# Patient Record
Sex: MALE | Race: WHITE | NOT HISPANIC OR LATINO | Employment: FULL TIME | URBAN - METROPOLITAN AREA
[De-identification: names, ages, dates, MRNs, and addresses within clinical notes are randomized per-mention and may not be internally consistent; named-entity substitution may affect disease eponyms.]

---

## 2022-12-28 ENCOUNTER — HOSPITAL ENCOUNTER (EMERGENCY)
Facility: HOSPITAL | Age: 42
Discharge: HOME | End: 2022-12-29
Attending: EMERGENCY MEDICINE
Payer: COMMERCIAL

## 2022-12-28 DIAGNOSIS — N20.0 RENAL STONE: Primary | ICD-10-CM

## 2022-12-28 LAB
ALBUMIN SERPL BCP-MCNC: 4.4 G/DL (ref 3.5–5.2)
ALP SERPL-CCNC: 54 U/L (ref 55–135)
ALT SERPL W/O P-5'-P-CCNC: 35 U/L (ref 10–44)
ANION GAP SERPL CALC-SCNC: 10 MMOL/L (ref 8–16)
AST SERPL-CCNC: 26 U/L (ref 10–40)
BACTERIA #/AREA URNS HPF: NEGATIVE /HPF
BASOPHILS # BLD AUTO: 0.06 K/UL (ref 0–0.2)
BASOPHILS NFR BLD: 0.5 % (ref 0–1.9)
BILIRUB SERPL-MCNC: 0.8 MG/DL (ref 0.1–1)
BILIRUB UR QL STRIP: ABNORMAL
BUN SERPL-MCNC: 20 MG/DL (ref 6–20)
CALCIUM SERPL-MCNC: 9.2 MG/DL (ref 8.7–10.5)
CHLORIDE SERPL-SCNC: 98 MMOL/L (ref 95–110)
CLARITY UR: CLEAR
CO2 SERPL-SCNC: 26 MMOL/L (ref 23–29)
COLOR UR: ABNORMAL
CREAT SERPL-MCNC: 0.9 MG/DL (ref 0.5–1.4)
CREAT SERPL-MCNC: 0.9 MG/DL (ref 0.5–1.4)
DIFFERENTIAL METHOD: ABNORMAL
EOSINOPHIL # BLD AUTO: 0.3 K/UL (ref 0–0.5)
EOSINOPHIL NFR BLD: 2.7 % (ref 0–8)
ERYTHROCYTE [DISTWIDTH] IN BLOOD BY AUTOMATED COUNT: 13.2 % (ref 11.5–14.5)
EST. GFR  (NO RACE VARIABLE): >60 ML/MIN/1.73 M^2
GLUCOSE SERPL-MCNC: 182 MG/DL (ref 70–110)
GLUCOSE UR QL STRIP: NEGATIVE
HCT VFR BLD AUTO: 39.5 % (ref 40–54)
HGB BLD-MCNC: 13.3 G/DL (ref 14–18)
HGB UR QL STRIP: ABNORMAL
HYALINE CASTS #/AREA URNS LPF: 3 /LPF
IMM GRANULOCYTES # BLD AUTO: 0.05 K/UL (ref 0–0.04)
IMM GRANULOCYTES NFR BLD AUTO: 0.4 % (ref 0–0.5)
KETONES UR QL STRIP: NEGATIVE
LEUKOCYTE ESTERASE UR QL STRIP: NEGATIVE
LYMPHOCYTES # BLD AUTO: 1.7 K/UL (ref 1–4.8)
LYMPHOCYTES NFR BLD: 14.1 % (ref 18–48)
MCH RBC QN AUTO: 28.9 PG (ref 27–31)
MCHC RBC AUTO-ENTMCNC: 33.7 G/DL (ref 32–36)
MCV RBC AUTO: 86 FL (ref 82–98)
MICROSCOPIC COMMENT: ABNORMAL
MONOCYTES # BLD AUTO: 0.8 K/UL (ref 0.3–1)
MONOCYTES NFR BLD: 6.3 % (ref 4–15)
NEUTROPHILS # BLD AUTO: 9.2 K/UL (ref 1.8–7.7)
NEUTROPHILS NFR BLD: 76 % (ref 38–73)
NITRITE UR QL STRIP: POSITIVE
NRBC BLD-RTO: 0 /100 WBC
PH UR STRIP: 6 [PH] (ref 5–8)
PLATELET # BLD AUTO: 394 K/UL (ref 150–450)
PMV BLD AUTO: 10.7 FL (ref 9.2–12.9)
POTASSIUM SERPL-SCNC: 3.3 MMOL/L (ref 3.5–5.1)
PROT SERPL-MCNC: 7.7 G/DL (ref 6–8.4)
PROT UR QL STRIP: ABNORMAL
RBC # BLD AUTO: 4.61 M/UL (ref 4.6–6.2)
RBC #/AREA URNS HPF: 38 /HPF (ref 0–4)
SAMPLE: NORMAL
SODIUM SERPL-SCNC: 134 MMOL/L (ref 136–145)
SP GR UR STRIP: 1.02 (ref 1–1.03)
SQUAMOUS #/AREA URNS HPF: 2 /HPF
URN SPEC COLLECT METH UR: ABNORMAL
UROBILINOGEN UR STRIP-ACNC: ABNORMAL EU/DL
WBC # BLD AUTO: 12.12 K/UL (ref 3.9–12.7)
WBC #/AREA URNS HPF: 4 /HPF (ref 0–5)

## 2022-12-28 PROCEDURE — 63600175 PHARM REV CODE 636 W HCPCS: Performed by: EMERGENCY MEDICINE

## 2022-12-28 PROCEDURE — 96365 THER/PROPH/DIAG IV INF INIT: CPT | Mod: 59

## 2022-12-28 PROCEDURE — 80053 COMPREHEN METABOLIC PANEL: CPT | Performed by: EMERGENCY MEDICINE

## 2022-12-28 PROCEDURE — 96375 TX/PRO/DX INJ NEW DRUG ADDON: CPT

## 2022-12-28 PROCEDURE — 85025 COMPLETE CBC W/AUTO DIFF WBC: CPT | Performed by: EMERGENCY MEDICINE

## 2022-12-28 PROCEDURE — 81001 URINALYSIS AUTO W/SCOPE: CPT | Performed by: EMERGENCY MEDICINE

## 2022-12-28 PROCEDURE — 99285 EMERGENCY DEPT VISIT HI MDM: CPT | Mod: 25

## 2022-12-28 PROCEDURE — 25500020 PHARM REV CODE 255: Performed by: EMERGENCY MEDICINE

## 2022-12-28 RX ORDER — KETOROLAC TROMETHAMINE 30 MG/ML
15 INJECTION, SOLUTION INTRAMUSCULAR; INTRAVENOUS
Status: COMPLETED | OUTPATIENT
Start: 2022-12-28 | End: 2022-12-28

## 2022-12-28 RX ADMIN — KETOROLAC TROMETHAMINE 15 MG: 30 INJECTION, SOLUTION INTRAMUSCULAR; INTRAVENOUS at 10:12

## 2022-12-28 RX ADMIN — IOHEXOL 100 ML: 350 INJECTION, SOLUTION INTRAVENOUS at 11:12

## 2022-12-29 VITALS
DIASTOLIC BLOOD PRESSURE: 85 MMHG | HEART RATE: 86 BPM | BODY MASS INDEX: 29.03 KG/M2 | HEIGHT: 67 IN | WEIGHT: 185 LBS | OXYGEN SATURATION: 100 % | TEMPERATURE: 98 F | SYSTOLIC BLOOD PRESSURE: 125 MMHG | RESPIRATION RATE: 14 BRPM

## 2022-12-29 PROCEDURE — 63600175 PHARM REV CODE 636 W HCPCS: Performed by: EMERGENCY MEDICINE

## 2022-12-29 RX ORDER — CEPHALEXIN 500 MG/1
500 CAPSULE ORAL 4 TIMES DAILY
Qty: 20 CAPSULE | Refills: 0 | Status: SHIPPED | OUTPATIENT
Start: 2022-12-29 | End: 2023-01-03

## 2022-12-29 RX ORDER — IBUPROFEN 600 MG/1
600 TABLET ORAL EVERY 6 HOURS PRN
Qty: 20 TABLET | Refills: 0 | Status: SHIPPED | OUTPATIENT
Start: 2022-12-29 | End: 2023-01-10

## 2022-12-29 RX ORDER — HYDROCODONE BITARTRATE AND ACETAMINOPHEN 5; 325 MG/1; MG/1
1 TABLET ORAL EVERY 6 HOURS PRN
Qty: 8 TABLET | Refills: 0 | Status: SHIPPED | OUTPATIENT
Start: 2022-12-29 | End: 2023-01-10

## 2022-12-29 RX ORDER — TAMSULOSIN HYDROCHLORIDE 0.4 MG/1
0.4 CAPSULE ORAL DAILY
Qty: 10 CAPSULE | Refills: 0 | Status: SHIPPED | OUTPATIENT
Start: 2022-12-29 | End: 2023-01-10

## 2022-12-29 RX ADMIN — CEFTRIAXONE 1 G: 1 INJECTION, SOLUTION INTRAVENOUS at 12:12

## 2022-12-29 NOTE — ED PROVIDER NOTES
Encounter Date: 12/28/2022       History     Chief Complaint   Patient presents with    Abdominal Pain     He is hav    Urinary Retention     He is having trouble going     HPI    Seen and evaluated.  Presented with a chief complaint of possibly urinary retention.  He notes suprapubic discomfort.  He states he is having trouble urinating.  He took azo prior to coming to the hospital secondary to concerns of possible urinary tract infection.  He does the pain is in the suprapubic region.  He denies any history of kidney stones or other urinary tract problems.  He reports a severe 9 to 10/10 focal suprapubic discomfort in the midline.  Reports no alleviating factors.  This is an acute episodic process that began earlier tonight and has been persistent    Review of patient's allergies indicates:   Allergen Reactions    Sulfa (sulfonamide antibiotics)      No past medical history on file.  No past surgical history on file.  No family history on file.     Review of Systems   Constitutional:  Negative for fever.   HENT:  Negative for sore throat.    Respiratory:  Negative for shortness of breath.    Cardiovascular:  Negative for chest pain.   Gastrointestinal:  Negative for nausea.   Genitourinary:  Positive for difficulty urinating and dysuria. Negative for penile discharge and testicular pain.   Musculoskeletal:  Negative for back pain.   Skin:  Negative for rash.   Neurological:  Negative for weakness.   Psychiatric/Behavioral:  Negative for confusion.    All other systems reviewed and are negative.    Physical Exam     Initial Vitals [12/28/22 2121]   BP Pulse Resp Temp SpO2   (!) 153/105 75 18 97.5 °F (36.4 °C) 97 %      MAP       --         Physical Exam    Nursing note and vitals reviewed.  Constitutional: He appears well-developed and well-nourished.   HENT:   Head: Normocephalic and atraumatic.   Eyes: Conjunctivae are normal.   Cardiovascular:  Normal rate and regular rhythm.           Abdominal: Abdomen is  soft.   Genitourinary:    Genitourinary Comments: Normal  exam, no tenderness to palpation of the bilateral testicles, no improvement in symptoms with elevation     Musculoskeletal:         General: Normal range of motion.     Neurological: He is alert and oriented to person, place, and time.   Skin: Skin is warm and dry.   Psychiatric: He has a normal mood and affect. His speech is normal.       ED Course   Procedures  Labs Reviewed   CBC W/ AUTO DIFFERENTIAL - Abnormal; Notable for the following components:       Result Value    Hemoglobin 13.3 (*)     Hematocrit 39.5 (*)     Gran # (ANC) 9.2 (*)     Immature Grans (Abs) 0.05 (*)     Gran % 76.0 (*)     Lymph % 14.1 (*)     All other components within normal limits   COMPREHENSIVE METABOLIC PANEL - Abnormal; Notable for the following components:    Sodium 134 (*)     Potassium 3.3 (*)     Glucose 182 (*)     Alkaline Phosphatase 54 (*)     All other components within normal limits   URINALYSIS - Abnormal; Notable for the following components:    Color, UA Orange (*)     Protein, UA Trace (*)     Bilirubin (UA) 1+ (*)     Occult Blood UA 2+ (*)     Nitrite, UA Positive (*)     Urobilinogen, UA 2.0-3.0 (*)     All other components within normal limits    Narrative:     Collection Type->Urine, Clean Catch   URINALYSIS MICROSCOPIC - Abnormal; Notable for the following components:    RBC, UA 38 (*)     Hyaline Casts, UA 3 (*)     All other components within normal limits    Narrative:     Collection Type->Urine, Clean Catch   ISTAT CREATININE          Imaging Results              CT Abdomen Pelvis With Contrast (Final result)  Result time 12/28/22 23:49:27      Final result by Ivan Gore MD (12/28/22 23:49:27)                   Narrative:    CT ABDOMEN PELVIS WITH IV CONTRAST    Exam date: December 28, 2022    Comparison: None    Indication: Abdominal Pain (He is hav), urinary Retention (He is having trouble going)    Technique:    Multiple helical axial images  were obtained through the abdomen and pelvis using intravenous contrast. Coronal and sagittal reformatted images were obtained.    All CT scans at this facility use dose modulation, iterative reconstruction, and/or weight-based dosing when appropriate to reduce radiation dose to as low as reasonably achievable.    Findings:    Lung bases: [Appear unremarkable].    Liver: [There is low-attenuation suggesting fatty changes.]    Gallbladder/biliary: [Appears unremarkable]    Pancreas: [Unremarkable.  No evidence of ductal enlargement.]    Spleen: Appears unremarkable. No splenomegaly.    Adrenals: Unremarkable.    Kidneys and ureters: There is 2 mm stone at the right ureterovesicular junction with mild right hydronephrosis and hydroureter and right perinephric and perirenal stranding. [There are a few small stones in both kidneys measuring up to 3 mm.  Normal enhancement.] There are a few cysts in both kidneys measuring up to 2.2 cm.    Bladder: Unremarkable.    Pelvic organs: Unremarkable.    Bowel: [No evidence of bowel obstruction.  No bowel wall thickening.] Appendix appears unremarkable.    Vasculature: Minimal aortic atherosclerosis noted.    Peritoneum: No free air. No significant free fluid.    Lymph nodes: Unremarkable.    Soft tissues: There is a tiny fat-containing umbilical hernia.    Bones: Unremarkable.    Impression:    1. Right-sided 2 mm stone at the ureterovesicular junction with mild right hydronephrosis and hydroureter and right perinephric and periureteral inflammatory changes.  2. Bilateral nephrolithiasis.  3. Hepatic steatosis.    Electronically signed by:  Ivan Gore MD  12/28/2022 11:49 PM CST Workstation: [id number]                                     Medications   ketorolac injection 15 mg (15 mg Intravenous Given 12/28/22 2222)   iohexoL (OMNIPAQUE 350) injection 100 mL (100 mLs Intravenous Given 12/28/22 2302)   cefTRIAXone (ROCEPHIN) 1 g/50 mL D5W IVPB (0 g Intravenous Stopped 12/29/22  0046)     Medical Decision Making:   Initial Assessment:   Seen and examined.  Presented initially with complaint of inability to urinate.  Ultrasound performed revealed only 20-30 cc of urine in the bladder.  This was not consistent with urinary retention.  Considered the possibility of testicular pathology with referred pain to the suprapubic region,  exam was normal without evidence of torsion, testicular swelling, or high-riding testicle.  Urinalysis was completed, and showed red cells and nitrites.  CT scan performed raised concern for urinary tract pathology including renal stone.  Treated with antibiotics, discharged on Keflex, had contacted Urology who will provide outpatient follow-up.  Hemodynamically stable and not in extremis the time of discharge.                        Clinical Impression:   Final diagnoses:  [N20.0] Renal stone (Primary)        ED Disposition Condition    Discharge Stable          ED Prescriptions       Medication Sig Dispense Start Date End Date Auth. Provider    cephALEXin (KEFLEX) 500 MG capsule Take 1 capsule (500 mg total) by mouth 4 (four) times daily. for 5 days 20 capsule 12/29/2022 1/3/2023 Unruly Briggs Jr., MD    tamsulosin (FLOMAX) 0.4 mg Cap Take 1 capsule (0.4 mg total) by mouth once daily. 10 capsule 12/29/2022 12/29/2023 Unruly Briggs Jr., MD    HYDROcodone-acetaminophen (NORCO) 5-325 mg per tablet Take 1 tablet by mouth every 6 (six) hours as needed for Pain. 8 tablet 12/29/2022 -- Unruly Briggs Jr., MD    ibuprofen (ADVIL,MOTRIN) 600 MG tablet Take 1 tablet (600 mg total) by mouth every 6 (six) hours as needed for Pain. 20 tablet 12/29/2022 -- Unruly Briggs Jr., MD          Follow-up Information    None          Unruly Briggs Jr., MD  12/29/22 3950

## 2023-01-10 ENCOUNTER — OFFICE VISIT (OUTPATIENT)
Dept: UROLOGY | Facility: CLINIC | Age: 43
End: 2023-01-10
Payer: COMMERCIAL

## 2023-01-10 VITALS
HEART RATE: 71 BPM | SYSTOLIC BLOOD PRESSURE: 121 MMHG | BODY MASS INDEX: 29.02 KG/M2 | HEIGHT: 67 IN | WEIGHT: 184.88 LBS | DIASTOLIC BLOOD PRESSURE: 79 MMHG

## 2023-01-10 DIAGNOSIS — N20.0 NEPHROLITHIASIS: Primary | ICD-10-CM

## 2023-01-10 LAB
BILIRUBIN, UA POC OHS: NEGATIVE
BLOOD, UA POC OHS: NEGATIVE
CLARITY, UA POC OHS: CLEAR
COLOR, UA POC OHS: YELLOW
GLUCOSE, UA POC OHS: NEGATIVE
KETONES, UA POC OHS: NEGATIVE
LEUKOCYTES, UA POC OHS: NEGATIVE
NITRITE, UA POC OHS: NEGATIVE
PH, UA POC OHS: 7
PROTEIN, UA POC OHS: NEGATIVE
SPECIFIC GRAVITY, UA POC OHS: 1.02
UROBILINOGEN, UA POC OHS: 0.2

## 2023-01-10 PROCEDURE — 1159F MED LIST DOCD IN RCRD: CPT | Mod: CPTII,S$GLB,, | Performed by: UROLOGY

## 2023-01-10 PROCEDURE — 1160F PR REVIEW ALL MEDS BY PRESCRIBER/CLIN PHARMACIST DOCUMENTED: ICD-10-PCS | Mod: CPTII,S$GLB,, | Performed by: UROLOGY

## 2023-01-10 PROCEDURE — 99999 PR PBB SHADOW E&M-EST. PATIENT-LVL IV: ICD-10-PCS | Mod: PBBFAC,,, | Performed by: UROLOGY

## 2023-01-10 PROCEDURE — 3074F SYST BP LT 130 MM HG: CPT | Mod: CPTII,S$GLB,, | Performed by: UROLOGY

## 2023-01-10 PROCEDURE — 3008F PR BODY MASS INDEX (BMI) DOCUMENTED: ICD-10-PCS | Mod: CPTII,S$GLB,, | Performed by: UROLOGY

## 2023-01-10 PROCEDURE — 81003 POCT URINALYSIS(INSTRUMENT): ICD-10-PCS | Mod: QW,S$GLB,, | Performed by: UROLOGY

## 2023-01-10 PROCEDURE — 3078F PR MOST RECENT DIASTOLIC BLOOD PRESSURE < 80 MM HG: ICD-10-PCS | Mod: CPTII,S$GLB,, | Performed by: UROLOGY

## 2023-01-10 PROCEDURE — 3074F PR MOST RECENT SYSTOLIC BLOOD PRESSURE < 130 MM HG: ICD-10-PCS | Mod: CPTII,S$GLB,, | Performed by: UROLOGY

## 2023-01-10 PROCEDURE — 3078F DIAST BP <80 MM HG: CPT | Mod: CPTII,S$GLB,, | Performed by: UROLOGY

## 2023-01-10 PROCEDURE — 1160F RVW MEDS BY RX/DR IN RCRD: CPT | Mod: CPTII,S$GLB,, | Performed by: UROLOGY

## 2023-01-10 PROCEDURE — 99999 PR PBB SHADOW E&M-EST. PATIENT-LVL IV: CPT | Mod: PBBFAC,,, | Performed by: UROLOGY

## 2023-01-10 PROCEDURE — 1159F PR MEDICATION LIST DOCUMENTED IN MEDICAL RECORD: ICD-10-PCS | Mod: CPTII,S$GLB,, | Performed by: UROLOGY

## 2023-01-10 PROCEDURE — 3008F BODY MASS INDEX DOCD: CPT | Mod: CPTII,S$GLB,, | Performed by: UROLOGY

## 2023-01-10 PROCEDURE — 99204 OFFICE O/P NEW MOD 45 MIN: CPT | Mod: S$GLB,,, | Performed by: UROLOGY

## 2023-01-10 PROCEDURE — 81003 URINALYSIS AUTO W/O SCOPE: CPT | Mod: QW,S$GLB,, | Performed by: UROLOGY

## 2023-01-10 PROCEDURE — 99204 PR OFFICE/OUTPT VISIT, NEW, LEVL IV, 45-59 MIN: ICD-10-PCS | Mod: S$GLB,,, | Performed by: UROLOGY

## 2023-01-10 RX ORDER — TAMSULOSIN HYDROCHLORIDE 0.4 MG/1
0.4 CAPSULE ORAL NIGHTLY
Qty: 30 CAPSULE | Refills: 3 | Status: SHIPPED | OUTPATIENT
Start: 2023-01-10 | End: 2023-04-18

## 2023-01-10 RX ORDER — ESCITALOPRAM OXALATE 10 MG/1
TABLET ORAL
COMMUNITY

## 2023-01-10 RX ORDER — DEXMETHYLPHENIDATE HYDROCHLORIDE 25 MG/1
CAPSULE, EXTENDED RELEASE ORAL
COMMUNITY

## 2023-01-10 RX ORDER — KETOROLAC TROMETHAMINE 10 MG/1
10 TABLET, FILM COATED ORAL EVERY 8 HOURS PRN
Qty: 10 TABLET | Refills: 0 | Status: SHIPPED | OUTPATIENT
Start: 2023-01-10 | End: 2023-04-18

## 2023-01-10 NOTE — PATIENT INSTRUCTIONS
Likely that he passed the stone.  Was very tiny.  No longer had any pain.  Urinalysis today negative.  Will hold off on checking the ultrasound right now to confirm.  However if he gets pain anytime soon within the next weeks then will obtain an ultrasound.  Otherwise will plan to get ultrasound an x-ray in 3 months.  Also he should do a 24 hour urine in 3 months or before 3 months.  In the meantime drink lots of water.  Avoid salt.  Avoid or decrease protein intake.  Increase citrate intake which is in lemon.     Short plan:   Sent in Toradol, Flomax to take if he feels like he is passing a stone.  However if pain last more than 2-3 days call us for follow-up and imaging to avoid ER.  However if he has fever or chills or severe pain or having nausea vomiting the just go to the ER.  Twenty-four urine collection  Renal bladder ultrasound and x-ray in 3 months.  Will monitor stones and treat if they enlarge.  Goal is to decrease rate of stone growth  Family history of prostate cancer.  Uncle/paternal  age 58.  Can check PSA 45 or 50.  Follow-up in 3 months to review x-ray, ultrasound, 24 urine.    Natural ways to increase citrate:  1/2 cup lemon juice in 2L of water per day    Prescription:  Potassium citrate (size of horse pills) 15meq twice a day - medication is in the coating so may see in stool    Online:   Moonstone citrate: 4 capsules a day (prefer)  [email or web address]            CITRATE FOR KIDNEY STONES: WHY IS CITRATE IMPORTANT?  Alkali citrate helps prevent kidney stones.      Anyone whos suffered from a kidney stone knows that they dont ever want another one. Alkali citrate for kidney stones is key to preventing this painful condition, although there are some pervasive myths about how it works and where to find it.    Not all citrates are the same. While many claim that drinking alkali citrate-rich lemon juice and water will help prevent  stones, the citrate that you can get from citric acid (such as lemon juice, citrus fruit, etc.) actually isnt enough to prevent the formation of kidney stones. In fact, youd have to drink several quarts of lemonade per day to get the right amount of citrus citrate to be preventive. Moreover, citrus fruits are relatively low in pH and dont support healthy kidney chemistry. Alkali citrate, however, is the powerful component that is known to prevent kidney stone formation along with other key nutrients and ingredients.      HOW IT WORKS  The science of alkali citrate & Moonstone.     Alkali citrate helps prevent kidney stone formation by reducing the ability of calcium in the urine to bind with oxalate. Specifically formulated by kidney stone specialists--including nephrologists and urologists--Moonstone contains clinically significant amounts of alkali citrate that increases urine citrate and urine pH in the body. This works because increased urine citrate helps prevent calcium stones, while increased urine pH helps dissolve and prevent uric acid stones and cystine stones. *1 Although citrate from lemon juice and citrus beverages may raise urine citrate, these drinks are missing the other key nutrients that help prevent stone formation. For citrate to enter the urine and act to prevent kidney stones, it must be ingested accompanied by sodium, potassium, or magnesium--and this is exactly what Moonstone provides. Ultimately, lemon juice and other beverages cannot realistically supply what the body needs to prevent kidney stones. Moonstones higher net citrate content provides more net base, also known as alkali, which is essential to preventing kidney stone formation.       CREATED BY KIDNEY SPECIALISTS  Painful stones can affect anyone.    Nephrologist and internationally recognized kidney stone expert Luiz Bergman MD--a  of [facility]--can relate to the unbearable pain of passing kidney stones, because  [facility] had three of his own. Dr. Bergman had his first stone in his late 20s, and he remembers it as if it were yesterday. I was walking down the street, and I was suddenly struck by this tremendous pain. It felt like a large s knife had been stuck in my back, Dr. Bergman says. And I was doubled over, soaked through my clothes sweating. A dramatic day in the ER followed. But once the stone passed, Dr. Bergman says that he--like most people--forgot about it. Very few people take steps to prevent a repeat attack, but most kidney stone sufferers will form another stone within 5-10 years.     Most people who pass relatively small stones in the emergency room--as I did--typically dont follow a healthy diet and dont drink enough water, Dr. Bergman says. 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 He wanted to find a preventive solution without relying on distasteful horse-sized prescription pills, a restrictive diet thats difficult for people to follow, or drinking gallons of water or lemonade per day. With an expert team of doctors and health care professionals coming together, Moonstone was formulated.

## 2023-01-10 NOTE — Clinical Note
· Renal bladder ultrasound and x-ray in 3 months.  Will monitor stones and treat if they enlarge.  Goal is to decrease rate of stone growth Send 24 hour kit to house Follow-up in 3 months to review x-ray, ultrasound, 24 urine.

## 2023-01-10 NOTE — PROGRESS NOTES
[facility] [place]  PCP: Jeffery Jasmine MD  Referred by: Self, Amrit  DOS: 1/10/2023    CC: stone      Subjective:      Initial consult by me in CLINIC for kidney stone  on 1/10/22    Mike Mason is a 42 y.o. male     The patient has been referred for a kidney stone found:   He went to the ER on 2022 with less than 24 hour history of right lower quadrant pain, urinary urgency.  No fevers, no chills.  Took an azo at home because his mom thought he might have a UTI and his mom also had a history of stones.  He had a CT renal stone study showed a 2 mm right UVJ stone.  The urinate and so they placed a Arreola catheter which showed nitrite positive urine but again he had taken an azo.  After they placed a Arreola his pain resolved.  He took Flomax for a few days after.  Never actually caught a stone.  Has not had any pain since.  Urinalysis today negative.    Imagin mm right UVJ stone with upstream hydro.  1 mm right mid pole stone.  Left mid pole 2 mm stone.  Left lower pole 2-3 mm stone.  Bilateral simple renal cyst.      Previous stone episode(s) and workup:No  Family history:Yes - mother with multiple stones  Any other stone risk factors: none (vit D/calcium)  Anticoagulation: No  The patient does not have any other urologic issues.     Today:   Symptoms: no pain.   Ua: negative     Other pertinent labs:  Lab Results   Component Value Date    CREATININE 0.9 2022     No results found for: LABA1C, HGBA1C  Lab Results   Component Value Date    CALCIUM 9.2 2022           Urine history: family history of kidney, bladder or prostate cancer:father had colon cancer a 53, Paternal uncle  a 58 of prostate cancer, personal or family history of kidney stones: mother with multiple stones, pt with stones,tobacco use: No, anticoagulation: No  1/10/23 Neg  22 Nit+/2+bld, 38 rbc/4 wbc/2 sq (on azo)    Current REVIEW OF SYSTEMS: Negative for the remaining 12 points of ROS  except for as stated above    No past medical history on file.    Fam Hx: Reviewed- pertinent family hx as above    Allergies:  Sulfa (sulfonamide antibiotics)    Objective:     Vitals:    01/10/23 1057   BP: 121/79   Pulse: 71         Pertinent  exam in HPI    LABS REVIEW:  Recent labs:   Recent Labs   Lab 22  2200   WBC 12.12   Hemoglobin 13.3 L   Hematocrit 39.5 L   Platelets 394   ]  Recent Labs   Lab 22  2200   Sodium 134 L   Potassium 3.3 L   Chloride 98   CO2 26   BUN 20   Creatinine 0.9   Glucose 182 H   Calcium 9.2   Alkaline Phosphatase 54 L   Total Protein 7.7   Albumin 4.4   Total Bilirubin 0.8   AST 26   ALT 35         Assessment:     Mike Mason is a 42 y.o. male with No diagnosis found.      Plan:     Likely that he passed the stone.  Was very tiny.  No longer had any pain.  Urinalysis today negative.  Will hold off on checking the ultrasound right now to confirm.  However if he gets pain anytime soon within the next weeks then will obtain an ultrasound.  Otherwise will plan to get ultrasound an x-ray in 3 months.  Also he should do a 24 hour urine in 3 months or before 3 months.  In the meantime drink lots of water.  Avoid salt.  Avoid or decrease protein intake.  Increase citrate intake which is in lemon.     Short plan:   Sent in Toradol, Flomax to take if he feels like he is passing a stone.  However if pain last more than 2-3 days call us for follow-up and imaging to avoid ER.  However if he has fever or chills or severe pain or having nausea vomiting the just go to the ER.  Twenty-four urine collection  Renal bladder ultrasound and x-ray in 3 months.  Will monitor stones and treat if they enlarge.  Goal is to decrease rate of stone growth  Family history of prostate cancer.  Uncle/paternal  age 58.  Can check PSA 45 or 50.  Follow-up in 3 months to review x-ray, ultrasound, 24 urine.      See below for details:   The spontaneous passage rate in 20 weeks was 312 out of 392  stones, 98% in 0-2 mm, 98% in 3 mm, 81% in 4 mm, 65% in 5 mm, 33% in 6 mm and 9% in ?6.5 mm wide stones.  The stone size and location predicted spontaneous ureteric stone passage. The side and the grade of hydronephrosis only predicted stone passage in specific subgroups                    Sury Esteban MD

## 2023-04-11 ENCOUNTER — HOSPITAL ENCOUNTER (OUTPATIENT)
Dept: RADIOLOGY | Facility: HOSPITAL | Age: 43
Discharge: HOME | End: 2023-04-11
Attending: UROLOGY
Payer: COMMERCIAL

## 2023-04-11 DIAGNOSIS — N20.0 NEPHROLITHIASIS: ICD-10-CM

## 2023-04-11 PROCEDURE — 74018 RADEX ABDOMEN 1 VIEW: CPT | Mod: TC,FY

## 2023-04-11 PROCEDURE — 74018 RADEX ABDOMEN 1 VIEW: CPT | Mod: 26,,, | Performed by: RADIOLOGY

## 2023-04-11 PROCEDURE — 74018 XR ABDOMEN AP 1 VIEW: ICD-10-PCS | Mod: 26,,, | Performed by: RADIOLOGY

## 2023-04-11 PROCEDURE — 76770 US RETROPERITONEAL COMPLETE: ICD-10-PCS | Mod: 26,,, | Performed by: RADIOLOGY

## 2023-04-11 PROCEDURE — 76770 US EXAM ABDO BACK WALL COMP: CPT | Mod: TC

## 2023-04-11 PROCEDURE — 76770 US EXAM ABDO BACK WALL COMP: CPT | Mod: 26,,, | Performed by: RADIOLOGY

## 2023-04-17 ENCOUNTER — TELEPHONE (OUTPATIENT)
Dept: UROLOGY | Facility: CLINIC | Age: 43
End: 2023-04-17
Payer: COMMERCIAL

## 2023-04-17 NOTE — TELEPHONE ENCOUNTER
Phoned patient has questions about containers in 24 hour urine kit. Patient provider phone to dary. Verbally voiced understanding.

## 2023-04-18 ENCOUNTER — OFFICE VISIT (OUTPATIENT)
Dept: UROLOGY | Facility: CLINIC | Age: 43
End: 2023-04-18
Payer: COMMERCIAL

## 2023-04-18 VITALS
SYSTOLIC BLOOD PRESSURE: 114 MMHG | HEIGHT: 67 IN | DIASTOLIC BLOOD PRESSURE: 78 MMHG | HEART RATE: 74 BPM | WEIGHT: 184.06 LBS | BODY MASS INDEX: 28.89 KG/M2

## 2023-04-18 DIAGNOSIS — N20.0 NEPHROLITHIASIS: Primary | ICD-10-CM

## 2023-04-18 LAB
BILIRUBIN, UA POC OHS: NEGATIVE
BLOOD, UA POC OHS: NEGATIVE
CLARITY, UA POC OHS: CLEAR
COLOR, UA POC OHS: YELLOW
GLUCOSE, UA POC OHS: NEGATIVE
KETONES, UA POC OHS: NEGATIVE
LEUKOCYTES, UA POC OHS: NEGATIVE
NITRITE, UA POC OHS: NEGATIVE
PH, UA POC OHS: 5.5
PROTEIN, UA POC OHS: NEGATIVE
SPECIFIC GRAVITY, UA POC OHS: 1.02
UROBILINOGEN, UA POC OHS: 0.2

## 2023-04-18 PROCEDURE — 3078F PR MOST RECENT DIASTOLIC BLOOD PRESSURE < 80 MM HG: ICD-10-PCS | Mod: CPTII,S$GLB,, | Performed by: UROLOGY

## 2023-04-18 PROCEDURE — 3074F SYST BP LT 130 MM HG: CPT | Mod: CPTII,S$GLB,, | Performed by: UROLOGY

## 2023-04-18 PROCEDURE — 1160F RVW MEDS BY RX/DR IN RCRD: CPT | Mod: CPTII,S$GLB,, | Performed by: UROLOGY

## 2023-04-18 PROCEDURE — 99214 OFFICE O/P EST MOD 30 MIN: CPT | Mod: S$GLB,,, | Performed by: UROLOGY

## 2023-04-18 PROCEDURE — 3078F DIAST BP <80 MM HG: CPT | Mod: CPTII,S$GLB,, | Performed by: UROLOGY

## 2023-04-18 PROCEDURE — 3008F PR BODY MASS INDEX (BMI) DOCUMENTED: ICD-10-PCS | Mod: CPTII,S$GLB,, | Performed by: UROLOGY

## 2023-04-18 PROCEDURE — 1160F PR REVIEW ALL MEDS BY PRESCRIBER/CLIN PHARMACIST DOCUMENTED: ICD-10-PCS | Mod: CPTII,S$GLB,, | Performed by: UROLOGY

## 2023-04-18 PROCEDURE — 81003 URINALYSIS AUTO W/O SCOPE: CPT | Mod: QW,S$GLB,, | Performed by: UROLOGY

## 2023-04-18 PROCEDURE — 1159F PR MEDICATION LIST DOCUMENTED IN MEDICAL RECORD: ICD-10-PCS | Mod: CPTII,S$GLB,, | Performed by: UROLOGY

## 2023-04-18 PROCEDURE — 3008F BODY MASS INDEX DOCD: CPT | Mod: CPTII,S$GLB,, | Performed by: UROLOGY

## 2023-04-18 PROCEDURE — 99999 PR PBB SHADOW E&M-EST. PATIENT-LVL III: ICD-10-PCS | Mod: PBBFAC,,, | Performed by: UROLOGY

## 2023-04-18 PROCEDURE — 81003 POCT URINALYSIS(INSTRUMENT): ICD-10-PCS | Mod: QW,S$GLB,, | Performed by: UROLOGY

## 2023-04-18 PROCEDURE — 1159F MED LIST DOCD IN RCRD: CPT | Mod: CPTII,S$GLB,, | Performed by: UROLOGY

## 2023-04-18 PROCEDURE — 99214 PR OFFICE/OUTPT VISIT, EST, LEVL IV, 30-39 MIN: ICD-10-PCS | Mod: S$GLB,,, | Performed by: UROLOGY

## 2023-04-18 PROCEDURE — 3074F PR MOST RECENT SYSTOLIC BLOOD PRESSURE < 130 MM HG: ICD-10-PCS | Mod: CPTII,S$GLB,, | Performed by: UROLOGY

## 2023-04-18 PROCEDURE — 99999 PR PBB SHADOW E&M-EST. PATIENT-LVL III: CPT | Mod: PBBFAC,,, | Performed by: UROLOGY

## 2023-04-18 NOTE — Clinical Note
· Follow-up in 4 to 6 weeks to review 24 urine with my nurse practitioner · F/u in 1 year with me with rbus and kub prior

## 2023-04-18 NOTE — PATIENT INSTRUCTIONS
Mike Mason is a 42 y.o. male with     1. Nephrolithiasis      On his last CT he had 2 tiny stones in his left, 1 in the mid pole and 1 in the lower pole and 1 stone on the right.  Recent ultrasound showed 7 mm stone but not visible on KUB (Xray of the abdomen to look for stones).  Likely overestimates the size.  KUB (Xray of the abdomen to look for stones) did not show any Awaiting results of 24 hour urine to determine if he has any other risk factors that increase his risk of making stones.          Plan:     Short plan:   Will finished a 24 urine collection and send in and he can follow up with Urology nurse practitioner to review the results once we get the results.  Renal bladder ultrasound and x-ray in 1 year and follow-up after s.  Will monitor stones and treat if they enlarge.  Goal is to decrease rate of stone growth  Family history of prostate cancer.  Uncle/paternal  age 58.  Can check PSA 45 or 50.  Follow-up in 4 to 6 weeks to review 24 urine with my nurse practitioner  F/u in 1 year with me with rbus and kub prior       See below for details:   The spontaneous passage rate in 20 weeks was 312 out of 392 stones, 98% in 0-2 mm, 98% in 3 mm, 81% in 4 mm, 65% in 5 mm, 33% in 6 mm and 9% in ?6.5 mm wide stones.  The stone size and location predicted spontaneous ureteric stone passage. The side and the grade of hydronephrosis only predicted stone passage in specific subgroups

## 2023-04-18 NOTE — PROGRESS NOTES
[facility] [place]  PCP: Jeffery Jasmine MD  Referred by: No ref. provider found  DOS: 2023    CC: stone      Subjective:      Initial consult by me in CLINIC for kidney stone  on 1/10/22    Mike Mason is a 42 y.o. male     The patient has been referred for a kidney stone found:   He went to the ER on 2022 with less than 24 hour history of right lower quadrant pain, urinary urgency.  No fevers, no chills.  Took an azo at home because his mom thought he might have a UTI and his mom also had a history of stones.  He had a CT renal stone study showed a 2 mm right UVJ stone.  The urinate and so they placed a Arreola catheter which showed nitrite positive urine but again he had taken an azo.  After they placed a Arreola his pain resolved.  He took Flomax for a few days after.  Never actually caught a stone.  Has not had any pain since.  Urinalysis today negative.    Imagin mm right UVJ stone with upstream hydro.  1 mm right mid pole stone.  Left mid pole 2 mm stone.  Left lower pole 2-3 mm stone.  Bilateral simple renal cyst.      Previous stone episode(s) and workup:No  Family history:Yes - mother with multiple stones  Any other stone risk factors: none (vit D/calcium)  Anticoagulation: No  The patient does not have any other urologic issues.     Interval history 23:  At his last visit 3 months ago he was no longer having any pain in his assumed he would passed the UVJ stone.  I had him do an x-ray and ultrasound on 2023, 3 months after his CT to monitor any remaining stones ensure no hydronephrosis seen.  Renal bladder ultrasound on 2023 showed no hydro and did show possible 7 mm stone on his right and no obvious stone seen on the left.  Renal bladder ultrasound showed left upper pole simple cyst.  KUB (Xray of the abdomen to look for stones) showed a possible stone on the left, 2 mm.  His stones that were on the CT were both very small in the kidneys.  Does state  that prior to the ultrasound x-ray he may have passed a stone because he had some burning when he was urinating, no blood but and no particular flank pain on 1 side versus the other.  His voided urine today shows no blood.  Had also asked him to do 24 hour urine collection which he is currently in the process of collecting.  He has been actively drinking more fluid including crystal light lemonade          Urine history: family history of kidney, bladder or prostate cancer:father had colon cancer a 53, Paternal uncle  a 58 of prostate cancer, personal or family history of kidney stones: mother with multiple stones, pt with stones,tobacco use: No, anticoagulation: No  23 neg  1/10/23 Neg  22 Nit+/2+bld, 38 rbc/4 wbc/2 sq (on azo)    Current REVIEW OF SYSTEMS: Negative for the remaining 12 points of ROS except for as stated above    No past medical history on file.    Fam Hx: Reviewed- pertinent family hx as above    Allergies:  Sulfa (sulfonamide antibiotics)    Objective:     Vitals:    23 1511   BP: 114/78   Pulse: 74         Pertinent  exam in HPI    LABS REVIEW:  Recent labs:   Recent Labs   Lab 22  2200   WBC 12.12   Hemoglobin 13.3 L   Hematocrit 39.5 L   Platelets 394   ]  Recent Labs   Lab 22  2200   Sodium 134 L   Potassium 3.3 L   Chloride 98   CO2 26   BUN 20   Creatinine 0.9   Glucose 182 H   Calcium 9.2   Alkaline Phosphatase 54 L   Total Protein 7.7   Albumin 4.4   Total Bilirubin 0.8   AST 26   ALT 35         Assessment:     Mike Mason is a 42 y.o. male with     1. Nephrolithiasis      On his last CT he had 2 tiny stones in his left, 1 in the mid pole and 1 in the lower pole and 1 stone on the right.  Recent ultrasound showed 7 mm stone but not visible on KUB (Xray of the abdomen to look for stones).  Likely overestimates the size.  KUB (Xray of the abdomen to look for stones) did not show any Awaiting results of 24 hour urine to determine if he has any other risk  factors that increase his risk of making stones.          Plan:     Short plan:   Will finished a 24 urine collection and send in and he can follow up with Urology nurse practitioner to review the results once we get the results.  Renal bladder ultrasound and x-ray in 1 year and follow-up after s.  Will monitor stones and treat if they enlarge.  Goal is to decrease rate of stone growth  Family history of prostate cancer.  Uncle/paternal  age 58.  Can check PSA 45 or 50.  Follow-up in 4 to 6 weeks to review 24 urine with my nurse practitioner  F/u in 1 year with me with rbus and kub prior       See below for details:   The spontaneous passage rate in 20 weeks was 312 out of 392 stones, 98% in 0-2 mm, 98% in 3 mm, 81% in 4 mm, 65% in 5 mm, 33% in 6 mm and 9% in ?6.5 mm wide stones.  The stone size and location predicted spontaneous ureteric stone passage. The side and the grade of hydronephrosis only predicted stone passage in specific subgroups                    Sury Esteban MD

## 2023-04-19 ENCOUNTER — TELEPHONE (OUTPATIENT)
Dept: UROLOGY | Facility: CLINIC | Age: 43
End: 2023-04-19
Payer: COMMERCIAL

## 2023-04-19 NOTE — TELEPHONE ENCOUNTER
Call placed to inform patient the doctor would like for him to see Christin Burns in 4-6 weeks to discuss 24 hour urine result, no answer, message left with call back number.

## 2023-04-19 NOTE — TELEPHONE ENCOUNTER
----- Message from Rocio Woods sent at 4/18/2023  6:01 PM CDT -----  Type: General Call Back     Name of Caller:AGNES BAE [[id number]]  Symptoms:scheduling  Would the patient rather a call back or a response via [facility]? Call back   Best Call Back Number:[phone]  Additional Information: patient indicates he is a patient with Dr. Esteban. Patient indicates he was told to schedule an appointment with JULIENNE Bruns. Patient believes the provider is the NP for  and would just like to make sure before scheduling. Patient believes there is another Np in the department and would like to make sure he schedules with the correct one. Patient would like a call back with further assistance and more information if possible.

## 2023-05-29 ENCOUNTER — OFFICE VISIT (OUTPATIENT)
Dept: UROLOGY | Facility: CLINIC | Age: 43
End: 2023-05-29
Payer: COMMERCIAL

## 2023-05-29 VITALS
HEIGHT: 67 IN | WEIGHT: 187 LBS | BODY MASS INDEX: 29.35 KG/M2 | DIASTOLIC BLOOD PRESSURE: 76 MMHG | SYSTOLIC BLOOD PRESSURE: 111 MMHG | HEART RATE: 68 BPM

## 2023-05-29 DIAGNOSIS — N20.0 KIDNEY STONES: Primary | ICD-10-CM

## 2023-05-29 PROCEDURE — 1159F MED LIST DOCD IN RCRD: CPT | Mod: CPTII,S$GLB,, | Performed by: NURSE PRACTITIONER

## 2023-05-29 PROCEDURE — 99214 PR OFFICE/OUTPT VISIT, EST, LEVL IV, 30-39 MIN: ICD-10-PCS | Mod: S$GLB,,, | Performed by: NURSE PRACTITIONER

## 2023-05-29 PROCEDURE — 3008F PR BODY MASS INDEX (BMI) DOCUMENTED: ICD-10-PCS | Mod: CPTII,S$GLB,, | Performed by: NURSE PRACTITIONER

## 2023-05-29 PROCEDURE — 1160F RVW MEDS BY RX/DR IN RCRD: CPT | Mod: CPTII,S$GLB,, | Performed by: NURSE PRACTITIONER

## 2023-05-29 PROCEDURE — 3074F SYST BP LT 130 MM HG: CPT | Mod: CPTII,S$GLB,, | Performed by: NURSE PRACTITIONER

## 2023-05-29 PROCEDURE — 99999 PR PBB SHADOW E&M-EST. PATIENT-LVL IV: CPT | Mod: PBBFAC,,, | Performed by: NURSE PRACTITIONER

## 2023-05-29 PROCEDURE — 1159F PR MEDICATION LIST DOCUMENTED IN MEDICAL RECORD: ICD-10-PCS | Mod: CPTII,S$GLB,, | Performed by: NURSE PRACTITIONER

## 2023-05-29 PROCEDURE — 1160F PR REVIEW ALL MEDS BY PRESCRIBER/CLIN PHARMACIST DOCUMENTED: ICD-10-PCS | Mod: CPTII,S$GLB,, | Performed by: NURSE PRACTITIONER

## 2023-05-29 PROCEDURE — 3008F BODY MASS INDEX DOCD: CPT | Mod: CPTII,S$GLB,, | Performed by: NURSE PRACTITIONER

## 2023-05-29 PROCEDURE — 99214 OFFICE O/P EST MOD 30 MIN: CPT | Mod: S$GLB,,, | Performed by: NURSE PRACTITIONER

## 2023-05-29 PROCEDURE — 3078F PR MOST RECENT DIASTOLIC BLOOD PRESSURE < 80 MM HG: ICD-10-PCS | Mod: CPTII,S$GLB,, | Performed by: NURSE PRACTITIONER

## 2023-05-29 PROCEDURE — 3074F PR MOST RECENT SYSTOLIC BLOOD PRESSURE < 130 MM HG: ICD-10-PCS | Mod: CPTII,S$GLB,, | Performed by: NURSE PRACTITIONER

## 2023-05-29 PROCEDURE — 3078F DIAST BP <80 MM HG: CPT | Mod: CPTII,S$GLB,, | Performed by: NURSE PRACTITIONER

## 2023-05-29 PROCEDURE — 99999 PR PBB SHADOW E&M-EST. PATIENT-LVL IV: ICD-10-PCS | Mod: PBBFAC,,, | Performed by: NURSE PRACTITIONER

## 2023-05-29 RX ORDER — METHYLPHENIDATE HYDROCHLORIDE 54 MG/1
TABLET ORAL
COMMUNITY

## 2023-05-29 NOTE — PROGRESS NOTES
CHIEF COMPLAINT:    Mr. Mason is a 42 y.o. male presenting for f/u kidney stone.  PRESENTING ILLNESS:    Mike Mason is a 42 y.o. male who presents for f/u kidney stone. Last clinic visit was 23 with Dr. Esteban.    Initial consult by me (Dr. Esteban) in [facility] for kidney stone  on 1/10/22     Mike Mason is a 42 y.o. male      The patient has been referred for a kidney stone found:   He went to the ER on 2022 with less than 24 hour history of right lower quadrant pain, urinary urgency.  No fevers, no chills.  Took an azo at home because his mom thought he might have a UTI and his mom also had a history of stones.  He had a CT renal stone study showed a 2 mm right UVJ stone.  The urinate and so they placed a Arreola catheter which showed nitrite positive urine but again he had taken an azo.  After they placed a Arreola his pain resolved.  He took Flomax for a few days after.  Never actually caught a stone.  Has not had any pain since.  Urinalysis today negative.     Imagin mm right UVJ stone with upstream hydro.  1 mm right mid pole stone.  Left mid pole 2 mm stone.  Left lower pole 2-3 mm stone.  Bilateral simple renal cyst.      Previous stone episode(s) and workup:No  Family history:Yes - mother with multiple stones  Any other stone risk factors: none (vit D/calcium)  Anticoagulation: No  The patient does not have any other urologic issues.      Interval history 23:  At his last visit 3 months ago he was no longer having any pain in his assumed he would passed the UVJ stone.  I had him do an x-ray and ultrasound on 2023, 3 months after his CT to monitor any remaining stones ensure no hydronephrosis seen.  Renal bladder ultrasound on 2023 showed no hydro and did show possible 7 mm stone on his right and no obvious stone seen on the left.  Renal bladder ultrasound showed left upper pole simple cyst.  KUB (Xray of the abdomen to look for stones) showed a possible stone on the  left, 2 mm.  His stones that were on the CT were both very small in the kidneys.  Does state that prior to the ultrasound x-ray he may have passed a stone because he had some burning when he was urinating, no blood but and no particular flank pain on 1 side versus the other.  His voided urine today shows no blood.  Had also asked him to do 24 hour urine collection which he is currently in the process of collecting.  He has been actively drinking more fluid including crystal light lemonade    Interval history 23  Patient presents for f/u kidney stone. Completed 24 hour urine.    Denies dysuria, gross hematuria, flank pain, fever, chills, nausea or vomiting.  Doing well since last visit.  Denies any issues voiding    Drinking ~60 oz per day of water/crystal light lemonade  Does eat a lot of chicken, fish, pork. minimal red meat intake.  Moderate salt intake  Does eat a lot of peanuts and turnip greens                   Urine history: family history of kidney, bladder or prostate cancer:father had colon cancer a 53, Paternal uncle  a 58 of prostate cancer, personal or family history of kidney stones: mother with multiple stones, pt with stones,tobacco use: No, anticoagulation: No  23            neg  1/10/23            Neg  22          Nit+/2+bld, 38 rbc/4 wbc/2 sq (on azo)      REVIEW OF SYSTEMS:    Review of Systems    Constitutional: Negative for fever and chills.   HENT: Negative for hearing loss.   Eyes: Negative for visual disturbance.   Respiratory: Negative for shortness of breath.   Cardiovascular: Negative for chest pain.   Gastrointestinal: Negative for nausea, vomiting.   Genitourinary:  See above  Neurological: Negative for dizziness.   Hematological: Does not bruise/bleed easily.   Psychiatric/Behavioral: Negative for confusion.       PATIENT HISTORY:    No past medical history on file.    No past surgical history on file.    No family history on file.    Social History      Socioeconomic History    Marital status:    Tobacco Use    Smoking status: Never     Passive exposure: Never    Smokeless tobacco: Never       Allergies:  Sulfa (sulfonamide antibiotics)    Medications:    Current Outpatient Medications:     EScitalopram oxalate (LEXAPRO) 10 MG tablet, escitalopram 10 mg tablet, Disp: , Rfl:     methylphenidate HCl 54 MG CR tablet, , Disp: , Rfl:     dexmethylphenidate (FOCALIN XR) 25 mg 24 hr capsule, dexmethylphenidate ER 25 mg capsule,extended release [id number], Disp: , Rfl:     PHYSICAL EXAMINATION:    Constitutional: He is oriented to person, place, and time. He appears well-developed and well-nourished.  He is in no apparent distress.    Neck: Normal ROM.     Cardiovascular: Normal rate.      Pulmonary/Chest: Effort normal. No respiratory distress.     Abdominal:  He exhibits no distension.  There is no CVA tenderness.     Neurological: He is alert and oriented to person, place, and time.     Skin: Skin is warm and dry.     Psych: Cooperative with normal affect.        Physical Exam      LABS:    No results found for: PSA, PSADIAG, PSATOTAL, PSAFREE, PSAFREEPCT  Lab Results   Component Value Date    CREATININE 0.9 12/28/2022         IMPRESSION:    Encounter Diagnoses   Name Primary?    Kidney stones Yes         PLAN:  -Reviewed 24 hour urine results with patient  -Pt prefers to hold off on starting any medication (potassium citrate) and try dietary modifications first.    The patient was encouraged to drink 2-3 liters of water a day, limit iced tea and sathish as well as foods high in oxalate.  They were cautioned to try to limit salt and red meat intake. Reduce poulty, fish and meat intake. Low oxalate diet (limit spinach, rhubarb, nuts, beets, potatoes, chocolate).  No vitamin C supplements. We also discussed adding citrate to the diet with the addition of traci or lemon juice to their water or alternatively with crystal light.      -RTC 1 year with   Eulalia with YELITZA and CHAPIN prior    I encouraged him or any of his family members to call or email me with questions and/or concerns.      30 minutes of total time spent on the encounter, which includes face to face time and non-face to face time preparing to see the patient (eg, review of tests), Obtaining and/or reviewing separately obtained history, Documenting clinical information in the electronic or other health record, Independently interpreting results (not separately reported) and communicating results to the patient/family/caregiver, or Care coordination (not separately reported).

## 2023-05-29 NOTE — PATIENT INSTRUCTIONS
-The patient was encouraged to drink 2-3 liters of water a day, limit iced tea and sathish as well as foods high in oxalate.  They were cautioned to try to limit salt and red meat intake. Reduce poulty, fish and meat intake. Low oxalate diet (limit spinach, rhubarb, nuts, beets, potatoes, chocolate).  No vitamin C supplements. We also discussed adding citrate to the diet with the addition of traci or lemon juice to their water or alternatively with crystal light.

## 2025-08-26 ENCOUNTER — TELEPHONE (OUTPATIENT)
Dept: UROLOGY | Facility: CLINIC | Age: 45
End: 2025-08-26
Payer: COMMERCIAL